# Patient Record
Sex: MALE | URBAN - METROPOLITAN AREA
[De-identification: names, ages, dates, MRNs, and addresses within clinical notes are randomized per-mention and may not be internally consistent; named-entity substitution may affect disease eponyms.]

---

## 2021-01-07 ENCOUNTER — APPOINTMENT (RX ONLY)
Dept: URBAN - METROPOLITAN AREA CLINIC 31 | Facility: CLINIC | Age: 71
Setting detail: DERMATOLOGY
End: 2021-01-07

## 2021-01-07 DIAGNOSIS — L40.0 PSORIASIS VULGARIS: ICD-10-CM

## 2021-01-07 PROCEDURE — 99203 OFFICE O/P NEW LOW 30 MIN: CPT

## 2021-01-07 PROCEDURE — ? TREATMENT REGIMEN

## 2021-01-07 PROCEDURE — ? COUNSELING

## 2021-01-07 ASSESSMENT — LOCATION DETAILED DESCRIPTION DERM: LOCATION DETAILED: RIGHT SUPERIOR UPPER BACK

## 2021-01-07 ASSESSMENT — LOCATION SIMPLE DESCRIPTION DERM: LOCATION SIMPLE: RIGHT UPPER BACK

## 2021-01-07 ASSESSMENT — LOCATION ZONE DERM: LOCATION ZONE: TRUNK

## 2021-01-07 NOTE — PROCEDURE: TREATMENT REGIMEN
Treatment 1: NBUVB
Increase/Decrease Instructions (Increase/Decrease To....): once daily
Action 4: Continue
Action 2: Increase
Increase/Decrease Free Form Instructions: on worst areas
Plan: The patient is not a candidate for immunosuppressive regimens due to his current diagnosis of lung cancer. Start NBUVB at [place] office in March, after pt is back in town Also start approval process for at home NBUVB light box, as the patient is blind, and his elderly wife will not be able to travel so often to [place] for long, so treatment in [place] is not a viable long term solution.
Detail Level: Generalized
with patient
Treatment 2: Clobetasol cream
Sig For Treatment 1 (If Needed): three times a week
Action 1: Start